# Patient Record
(demographics unavailable — no encounter records)

---

## 2024-10-14 NOTE — PROCEDURE
[FreeTextEntry1] : Plan:  Examination.  (Gl=[id number]).  We had a lengthy discussion concerning the patient's condition.  He was biomechanically evaluated and electronically scanned in the prone position for custom foot orthosis.  Nail biopsy was performed for both great toes. (Tf=[id number]).  Results to be reviewed when available.  Patient is opting for topical treatment.  Elongated nails were trimmed.  We discussed diabetic precautions. Patient to return: 1 month.

## 2024-10-14 NOTE — REASON FOR VISIT
[Follow-Up Visit] : a follow-up visit for [Ingrown Nail] : ingrown nail [FreeTextEntry2] : diabetic foot care

## 2024-10-14 NOTE — HISTORY OF PRESENT ILLNESS
[FreeTextEntry1] : Jourdan is a 57-year-old male who presents to our office for diabetic foot care and for new orthosis.   He has increased difficulty taking care of his feet and his wife refuses to do it.  He also finds he is unstable on his feet.  His orthosis are quite worn.

## 2024-10-14 NOTE — PHYSICAL EXAM
[FreeTextEntry3] : Vascular Exam: DP Pulse (left): 1/4. DP Pulse (right): 1/4. PT Pulse (left): 1/4. PT Pulse (right): 1/4. Capillary Return - L: Instantaneous. Capillary Return - R: Instantaneous. Temperature Grad - L: Hot to Warm. Temperature Grad - R: Hot to Warm. [de-identified] : Orthopedic Exam:  Patient presents with a hypermobile varus foot type.  His current orthosis and sneakers are quite worn.  No pain upon examination at this time. [FreeTextEntry1] : Neurological Exam: Sharp / Dull - L: WNL. Sharp / Dull - R: WNL. Light Touch/pressure - L: WNL. Light Touch/pressure - R: WNL. Hot/cold - L: WNL. Hot/cold - R: WNL. Vibratory - L: WNL. Vibratory - R: WNL.

## 2024-11-05 NOTE — REASON FOR VISIT
[Follow-Up Visit] : a follow-up visit for [Onychomycosis] : onychomycosis [FreeTextEntry2] : dispense orthosis

## 2024-11-05 NOTE — HISTORY OF PRESENT ILLNESS
[FreeTextEntry1] : Jourdan is a 57-year-old male who presents to our office today for follow up care of a fungus infection in his toenails confirmed by biopsy and to have his new orthosis dispensed to him.  He did not yet start his medication.

## 2024-11-05 NOTE — PHYSICAL EXAM
[FreeTextEntry3] : Vascular Exam: DP Pulse (left): 1/4. DP Pulse (right): 1/4. PT Pulse (left): 1/4. PT Pulse (right): 1/4. Capillary Return - L: Instantaneous. Capillary Return - R: Instantaneous. Temperature Grad - L: Hot to Warm. Temperature Grad - R: Hot to Warm. [de-identified] : Orthopedic Exam:  Patient presents with a hypermobile varus foot type.  The new orthosis well accommodates his condition. [FreeTextEntry1] : Neurological Exam: Sharp / Dull - L: WNL. Sharp / Dull - R: WNL. Light Touch/pressure - L: WNL. Light Touch/pressure - R: WNL. Hot/cold - L: WNL. Hot/cold - R: WNL. Vibratory - L: WNL. Vibratory - R: WNL.